# Patient Record
Sex: FEMALE | Race: BLACK OR AFRICAN AMERICAN | NOT HISPANIC OR LATINO | Employment: FULL TIME | URBAN - METROPOLITAN AREA
[De-identification: names, ages, dates, MRNs, and addresses within clinical notes are randomized per-mention and may not be internally consistent; named-entity substitution may affect disease eponyms.]

---

## 2023-07-20 ENCOUNTER — TELEPHONE (OUTPATIENT)
Dept: PRIMARY CARE | Facility: CLINIC | Age: 23
End: 2023-07-20
Payer: COMMERCIAL

## 2023-09-27 LAB
CHLAMYDIA TRACH., AMPLIFIED: NEGATIVE
N. GONORRHEA, AMPLIFIED: NEGATIVE
TRICHOMONAS VAGINALIS: NEGATIVE

## 2023-10-06 ENCOUNTER — LAB (OUTPATIENT)
Dept: LAB | Facility: LAB | Age: 23
End: 2023-10-06
Payer: COMMERCIAL

## 2023-10-06 DIAGNOSIS — Z11.3 ENCOUNTER FOR SCREENING FOR INFECTIONS WITH A PREDOMINANTLY SEXUAL MODE OF TRANSMISSION: Primary | ICD-10-CM

## 2023-10-06 PROCEDURE — 36415 COLL VENOUS BLD VENIPUNCTURE: CPT

## 2023-10-06 PROCEDURE — 87340 HEPATITIS B SURFACE AG IA: CPT

## 2023-10-06 PROCEDURE — 87389 HIV-1 AG W/HIV-1&-2 AB AG IA: CPT

## 2023-10-06 PROCEDURE — 86803 HEPATITIS C AB TEST: CPT

## 2023-10-06 PROCEDURE — 86780 TREPONEMA PALLIDUM: CPT

## 2023-10-07 LAB
HBV SURFACE AG SERPL QL IA: NONREACTIVE
HCV AB SER QL: NONREACTIVE
HIV 1+2 AB+HIV1 P24 AG SERPL QL IA: NONREACTIVE
T PALLIDUM AB SER QL: NONREACTIVE

## 2023-11-14 ENCOUNTER — APPOINTMENT (OUTPATIENT)
Dept: OBSTETRICS AND GYNECOLOGY | Facility: CLINIC | Age: 23
End: 2023-11-14
Payer: COMMERCIAL

## 2023-11-24 ENCOUNTER — HOSPITAL ENCOUNTER (EMERGENCY)
Facility: HOSPITAL | Age: 23
Discharge: HOME | End: 2023-11-24
Payer: COMMERCIAL

## 2023-11-24 ENCOUNTER — APPOINTMENT (OUTPATIENT)
Dept: RADIOLOGY | Facility: HOSPITAL | Age: 23
End: 2023-11-24
Payer: COMMERCIAL

## 2023-11-24 VITALS
WEIGHT: 123 LBS | OXYGEN SATURATION: 98 % | HEART RATE: 75 BPM | HEIGHT: 59 IN | DIASTOLIC BLOOD PRESSURE: 93 MMHG | SYSTOLIC BLOOD PRESSURE: 125 MMHG | BODY MASS INDEX: 24.8 KG/M2 | RESPIRATION RATE: 18 BRPM | TEMPERATURE: 97.9 F

## 2023-11-24 DIAGNOSIS — S06.0X0A CONCUSSION WITHOUT LOSS OF CONSCIOUSNESS, INITIAL ENCOUNTER: Primary | ICD-10-CM

## 2023-11-24 PROCEDURE — 2500000005 HC RX 250 GENERAL PHARMACY W/O HCPCS: Performed by: PHYSICIAN ASSISTANT

## 2023-11-24 PROCEDURE — 72125 CT NECK SPINE W/O DYE: CPT | Performed by: RADIOLOGY

## 2023-11-24 PROCEDURE — 72125 CT NECK SPINE W/O DYE: CPT

## 2023-11-24 PROCEDURE — 2500000001 HC RX 250 WO HCPCS SELF ADMINISTERED DRUGS (ALT 637 FOR MEDICARE OP): Performed by: PHYSICIAN ASSISTANT

## 2023-11-24 PROCEDURE — 70450 CT HEAD/BRAIN W/O DYE: CPT | Performed by: RADIOLOGY

## 2023-11-24 PROCEDURE — 99285 EMERGENCY DEPT VISIT HI MDM: CPT | Mod: 25

## 2023-11-24 PROCEDURE — 70450 CT HEAD/BRAIN W/O DYE: CPT

## 2023-11-24 RX ORDER — IBUPROFEN 600 MG/1
600 TABLET ORAL ONCE
Status: COMPLETED | OUTPATIENT
Start: 2023-11-24 | End: 2023-11-24

## 2023-11-24 RX ORDER — IBUPROFEN 600 MG/1
600 TABLET ORAL EVERY 6 HOURS PRN
Qty: 20 TABLET | Refills: 0 | Status: SHIPPED | OUTPATIENT
Start: 2023-11-24 | End: 2023-11-29

## 2023-11-24 RX ORDER — GUAIFENESIN 1200 MG
650 TABLET, EXTENDED RELEASE 12 HR ORAL EVERY 6 HOURS PRN
Qty: 20 CAPSULE | Refills: 0 | Status: SHIPPED | OUTPATIENT
Start: 2023-11-24 | End: 2023-11-29

## 2023-11-24 RX ORDER — ONDANSETRON 4 MG/1
4 TABLET, ORALLY DISINTEGRATING ORAL EVERY 8 HOURS PRN
Qty: 9 TABLET | Refills: 0 | Status: SHIPPED | OUTPATIENT
Start: 2023-11-24 | End: 2023-11-27

## 2023-11-24 RX ORDER — ACETAMINOPHEN 325 MG/1
650 TABLET ORAL ONCE
Status: COMPLETED | OUTPATIENT
Start: 2023-11-24 | End: 2023-11-24

## 2023-11-24 RX ORDER — ONDANSETRON 4 MG/1
4 TABLET, ORALLY DISINTEGRATING ORAL ONCE
Status: COMPLETED | OUTPATIENT
Start: 2023-11-24 | End: 2023-11-24

## 2023-11-24 RX ADMIN — ONDANSETRON 4 MG: 4 TABLET, ORALLY DISINTEGRATING ORAL at 19:13

## 2023-11-24 RX ADMIN — IBUPROFEN 600 MG: 600 TABLET, FILM COATED ORAL at 19:13

## 2023-11-24 RX ADMIN — ACETAMINOPHEN 650 MG: 325 TABLET ORAL at 19:13

## 2023-11-24 ASSESSMENT — COLUMBIA-SUICIDE SEVERITY RATING SCALE - C-SSRS
2. HAVE YOU ACTUALLY HAD ANY THOUGHTS OF KILLING YOURSELF?: NO
1. IN THE PAST MONTH, HAVE YOU WISHED YOU WERE DEAD OR WISHED YOU COULD GO TO SLEEP AND NOT WAKE UP?: NO
6. HAVE YOU EVER DONE ANYTHING, STARTED TO DO ANYTHING, OR PREPARED TO DO ANYTHING TO END YOUR LIFE?: NO

## 2023-11-24 NOTE — ED PROVIDER NOTES
HPI     CC: Head Injury (Pt to ED for c/o head injury at work today around 1600. Pt states she hit the top of her head. C/o blurry vision, nausea and feeling tired. )     HPI: Dianna Higgins is a 23 y.o. female with a history of migraines presents with concern for head injury.  Patient states that she works as a  and she was tasked with putting the [employer] tree today.  They asked her to use a new lift which she did not feel comfortable with.  She went to move something and sit up quickly and hit her head on a metal bar.  She did not lose consciousness and does not take anticoagulation.  She took a couple steps forward because she felt kind of weak all over but started to feel better.  Still felt that she should get checked out.  Initially went to urgent care who sent her here.  Her symptoms include an inability to focus, nausea, fatigue, photophobia, and persistent headache.  She has never had a concussion before.  She has not taken any medication at this time.  She denies paresthesias to the extremities.    ROS: 10-point review of systems was performed and is otherwise negative except as noted in HPI.      Past Medical History: Noncontributory except per HPI     Past Surgical History: Noncontributory except per HPI     Family History: Reviewed and noncontributory     Social History:  Denies tobacco. Denies ETOH. Denies illicit drugs.      Allergies   Allergen Reactions    Penicillins Rash       Home Meds:   Current Outpatient Medications   Medication Instructions    acetaminophen (TYLENOL) 650 mg, oral, Every 6 hours PRN    ibuprofen 600 mg, oral, Every 6 hours PRN    ondansetron ODT (ZOFRAN-ODT) 4 mg, oral, Every 8 hours PRN        ED Triage Vitals [11/24/23 1748]   Temp Heart Rate Resp BP   36.6 °C (97.9 °F) 75 18 (!) 125/93      SpO2 Temp src Heart Rate Source Patient Position   98 % -- -- --      BP Location FiO2 (%)     -- --         Heart Rate:  [75]   Temp:  [36.6 °C (97.9 °F)]   Resp:  [18]  "  BP: (125)/(93)   Height:  [149.9 cm (4' 11\")]   Weight:  [55.8 kg (123 lb)]   SpO2:  [98 %]      Physical Exam:  Physical Exam  Constitutional:       General: She is not in acute distress.     Appearance: Normal appearance. She is not ill-appearing.   HENT:      Head: Normocephalic and atraumatic.      Comments: Tenderness to the top of the head with some mild erythema.  No palpable deformities or lacerations.     Right Ear: External ear normal.      Left Ear: External ear normal.      Nose: Nose normal.      Mouth/Throat:      Mouth: Mucous membranes are moist.   Eyes:      Extraocular Movements: Extraocular movements intact.      Conjunctiva/sclera: Conjunctivae normal.      Pupils: Pupils are equal, round, and reactive to light.   Cardiovascular:      Rate and Rhythm: Normal rate and regular rhythm.      Pulses: Normal pulses.   Pulmonary:      Effort: Pulmonary effort is normal. No respiratory distress.      Breath sounds: Normal breath sounds.   Abdominal:      General: Abdomen is flat.      Palpations: Abdomen is soft.      Tenderness: There is no abdominal tenderness.   Musculoskeletal:         General: Normal range of motion.      Cervical back: Normal range of motion and neck supple. No rigidity or tenderness.   Skin:     General: Skin is warm and dry.      Capillary Refill: Capillary refill takes less than 2 seconds.   Neurological:      General: No focal deficit present.      Mental Status: She is alert and oriented to person, place, and time.      Sensory: No sensory deficit.      Motor: No weakness.      Coordination: Coordination normal.      Gait: Gait normal.   Psychiatric:         Mood and Affect: Mood normal.          Diagnostic Results        Labs Reviewed - No data to display      CT head wo IV contrast   Final Result   1. No acute intracranial hemorrhage or mass effect.   2. No acute fracture or traumatic malalignment of the cervical spine.        Signed by: Harvey Bills 11/24/2023 6:37 " PM   Dictation workstation:   [id number]      CT cervical spine wo IV contrast   Final Result   1. No acute intracranial hemorrhage or mass effect.   2. No acute fracture or traumatic malalignment of the cervical spine.        Signed by: Harvey Bills 11/24/2023 6:37 PM   Dictation workstation:   [id number]                    No data recorded                Procedure  Procedures    ED Course & MDM   Assessment/Plan:     Medications   ondansetron ODT (Zofran-ODT) disintegrating tablet 4 mg (4 mg oral Given 11/24/23 1913)   acetaminophen (Tylenol) tablet 650 mg (650 mg oral Given 11/24/23 1913)   ibuprofen tablet 600 mg (600 mg oral Given 11/24/23 1913)        Diagnoses as of 11/24/23 2044   Concussion without loss of consciousness, initial encounter       Medical Decision Making    Dianna Higgins is a 23 y.o. female with a history of migraines presents after head injury.  The patient is nontoxic-appearing and her vital signs are normal.  Her physical exam is reassuring.  Based on her presentation, differential diagnosis includes concussion, TBI, or cervical spine strain.  Will obtain CT head without contrast and CT C-spine without contrast.  These were obtained and were negative.  Patient was given 4 mg oral Zofran, 650 mg of Tylenol, and 600 mg of Motrin.  No further work-up indicated at this time.  Patient is seen ambulating without difficulty.    Concussion: Patient was educated that any head strike is considered a concussion.  This can sometimes have a lasting side effect such as prolonged headaches or photophobia.  Recommend avoiding any activity that worsens your symptoms such as screen time, loud noises, or bright lights.  We will place an order for neurology follow-up for concussion clinic follow-up if symptoms persist.  Can take Tylenol and Motrin for additional pain control.  I have also prescribed Zofran for any persistent nausea.  The patient is in school, so she was given a school excuse to  have a few days off.  Strongly recommended following up with neurology as they can also rachel paperwork for extended testing time or any other school accommodations.  Recommended monitoring for signs and symptoms of worsening head injury such as increased headache, vision changes, confusion, nausea, or vomiting.  Patient agreeable to plan of care and felt comfortable returning home.    Disposition: Home    ED Prescriptions       Medication Sig Dispense Start Date End Date Auth. Provider    ondansetron ODT (Zofran-ODT) 4 mg disintegrating tablet Take 1 tablet (4 mg) by mouth every 8 hours if needed for nausea or vomiting for up to 3 days. 9 tablet 11/24/2023 11/27/2023 Jannet Ruth PA-C    ibuprofen 600 mg tablet Take 1 tablet (600 mg) by mouth every 6 hours if needed for mild pain (1 - 3) or moderate pain (4 - 6) for up to 5 days. 20 tablet 11/24/2023 11/29/2023 MOSHE RochaC    acetaminophen (Tylenol) 325 mg capsule Take 2 capsules (650 mg) by mouth every 6 hours if needed for mild pain (1 - 3) or moderate pain (4 - 6) for up to 5 days. 20 capsule 11/24/2023 11/29/2023 Jannet Ruth PA-C            Social Determinants Affecting Care: none      Jannet Ruth PA-C    This note was dictated by speech recognition. Minor errors in transcription may be present.     Jannet Ruth PA-C  11/24/23 2044

## 2023-11-24 NOTE — Clinical Note
Dianna Higgins was seen and treated in our emergency department on 11/24/2023.  She may return to work on 11/29/2023.       If you have any questions or concerns, please don't hesitate to call.      Jannet Ruth PA-C

## 2023-11-24 NOTE — Clinical Note
Dianna Higgins was seen and treated in our emergency department on 11/24/2023.  She may return to school on 11/29/2023.      If you have any questions or concerns, please don't hesitate to call.      Jannet Ruth PA-C

## 2023-11-29 ENCOUNTER — OFFICE VISIT (OUTPATIENT)
Dept: PRIMARY CARE | Facility: CLINIC | Age: 23
End: 2023-11-29
Payer: COMMERCIAL

## 2023-11-29 VITALS
HEART RATE: 73 BPM | WEIGHT: 123.8 LBS | SYSTOLIC BLOOD PRESSURE: 115 MMHG | HEIGHT: 59 IN | BODY MASS INDEX: 24.96 KG/M2 | DIASTOLIC BLOOD PRESSURE: 80 MMHG

## 2023-11-29 DIAGNOSIS — S06.0X0A CONCUSSION WITHOUT LOSS OF CONSCIOUSNESS, INITIAL ENCOUNTER: Primary | ICD-10-CM

## 2023-11-29 PROCEDURE — 1036F TOBACCO NON-USER: CPT | Performed by: FAMILY MEDICINE

## 2023-11-29 PROCEDURE — 99203 OFFICE O/P NEW LOW 30 MIN: CPT | Performed by: FAMILY MEDICINE

## 2023-11-29 RX ORDER — CLOBETASOL PROPIONATE 0.46 MG/ML
SOLUTION TOPICAL
COMMUNITY
Start: 2023-10-13

## 2023-11-29 RX ORDER — ADAPALENE AND BENZOYL PEROXIDE 3; 25 MG/G; MG/G
GEL TOPICAL
COMMUNITY
Start: 2019-01-21

## 2023-11-29 RX ORDER — AZITHROMYCIN 250 MG/1
TABLET, FILM COATED ORAL
COMMUNITY
Start: 2023-01-20

## 2023-11-29 NOTE — PROGRESS NOTES
Pt is here for concussion.     Dianna Higgins is a 23 year old female presenting to concussion clinic for evaluation.     Nov 24, 2023- she was working at [employer] at [place] and she was putting up [employer] and there was a piece of machinery that lifts her. She was working and reaching and she accidentally slammed her head into a metal bar. No LOC.  Her mother eventually got her and took her to the ER.   ER did scans then she was discharged.     Headache  Pressure on top of head  Fuzzy fog sensation  Neck pain    Work: [employer]- she has not been back to work since the injury.   School: [employer].   Sports: goes to the gym.   Paperwork: work related, has filled out a FROI. No EMS. No current attorneys.       Date of current head injury:   - Nov 24, 2023  Previous concussion history:  - none  Imaging for a head injury/concussion:  - CT head and c-spine 11/24/23: unremarkable.   Depression, anxiety, psychiatric disorder, learning disability, dyslexia, ADD/ADHD?:  - no formal dx.   Headache history:   - migraine headaches      Concussion symptoms: severity 0 to 6    Headache 2  Pressure in head 3  Neck pain 3  Nausea or vomiting 0  Dizziness 1  Blurred vision 1  Balance problems 1  Sensitivity to light 2  Sensitivity to noise 0  Feeling slowed down 3  Feeling like in a fog 3  Don't feel right 2  Difficulty concentrating 2  Difficulty remembering 2  Fatigue or low energy 1  Confusion 2  Drowsiness 2  More emotional 3  Irritability 3  Sadness 3  Nervous or Anxious 3  Trouble falling asleep     Visit Vitals  /80   Pulse 73        Gen: NAD, Alert and oriented x3  Head: generalized scalp tenderness.  Face: no specific areas of ttp; no step offs  Psych: mood pleasant and appropriate  Resp: good respiratory effort  Neurologic: CN II-XII grossly intact. Strength and sensation symmetric and intact throughout all 4 extremities. DTR 2+ in all 4 extremities. Cerebellar testing  normal. Negative Rhomberg's test. No dysdiadochokinesis.  Gait: normal      CT head wo IV contrast, CT cervical spine wo IV contrast  Narrative: Interpreted By:  Harvey Bills,   STUDY:  CT HEAD WO IV CONTRAST; CT CERVICAL SPINE WO IV CONTRAST;  11/24/2023  6:12 pm      INDICATION:  Signs/Symptoms:Trauma; Signs/Symptoms:trauma.      COMPARISON:  None.      ACCESSION NUMBER(S):  [id number]; [id number]      ORDERING CLINICIAN:  ANALISA PINTO      TECHNIQUE:  Axial noncontrast CT images of the head and cervical spine. Sagittal  and coronal reformats were provided.      FINDINGS:  Head:      BRAIN: No acute intracranial hemorrhage. No mass effect or midline  shift. Gray-white matter interfaces are preserved. VENTRICLES and  EXTRA-AXIAL SPACES: No hydrocephalus or extra-axial collection. Cavum  septum pellucidum et vergae. EXTRACRANIAL SOFT TISSUES:  Mild soft  tissue swelling about along the scalp vertex. PARANASAL  SINUSES/MASTOIDS: The visualized paranasal sinuses and mastoid air  cells are aerated. BONES AND ORBITS: No displaced skull fracture. No  suspicious osseous lesion.  Orbits are within normal limits. OTHER  FINDINGS: None.      Cervical Spine:      FRACTURES: There is no evidence for an acute fracture of the cervical  spine. VERTEBRAL ALIGNMENT: Straightening of normal cervical lordosis  which may be related to muscle spasm or patient positioning.  CRANIOCERVICAL JUNCTION: Within normal limits. VERTEBRA/DISC SPACES:  Vertebral body heights and the disc spaces are within normal limits.  SOFT TISSUES: Within normal limits. UPPER CHEST: Visualized portions  are within normal limits.      Impression: 1. No acute intracranial hemorrhage or mass effect.  2. No acute fracture or traumatic malalignment of the cervical spine.      Signed by: Harvey Bills 11/24/2023 6:37 PM  Dictation workstation:   [id number]      CT head wo IV contrast    Result Date: 11/24/2023  Interpreted By:  Negar  Harvey, STUDY: CT HEAD WO IV CONTRAST; CT CERVICAL SPINE WO IV CONTRAST;  11/24/2023 6:12 pm   INDICATION: Signs/Symptoms:Trauma; Signs/Symptoms:trauma.   COMPARISON: None.   ACCESSION NUMBER(S): [id number]; [id number]   ORDERING CLINICIAN: ANALISA PINTO   TECHNIQUE: Axial noncontrast CT images of the head and cervical spine. Sagittal and coronal reformats were provided.   FINDINGS: Head:   BRAIN: No acute intracranial hemorrhage. No mass effect or midline shift. Gray-white matter interfaces are preserved. VENTRICLES and EXTRA-AXIAL SPACES: No hydrocephalus or extra-axial collection. Cavum septum pellucidum et vergae. EXTRACRANIAL SOFT TISSUES:  Mild soft tissue swelling about along the scalp vertex. PARANASAL SINUSES/MASTOIDS: The visualized paranasal sinuses and mastoid air cells are aerated. BONES AND ORBITS: No displaced skull fracture. No suspicious osseous lesion.  Orbits are within normal limits. OTHER FINDINGS: None.   Cervical Spine:   FRACTURES: There is no evidence for an acute fracture of the cervical spine. VERTEBRAL ALIGNMENT: Straightening of normal cervical lordosis which may be related to muscle spasm or patient positioning. CRANIOCERVICAL JUNCTION: Within normal limits. VERTEBRA/DISC SPACES: Vertebral body heights and the disc spaces are within normal limits. SOFT TISSUES: Within normal limits. UPPER CHEST: Visualized portions are within normal limits.       1. No acute intracranial hemorrhage or mass effect. 2. No acute fracture or traumatic malalignment of the cervical spine.   Signed by: Harvey Bills 11/24/2023 6:37 PM Dictation workstation:   [id number]     1. Concussion without loss of consciousness, initial encounter               You have suffered a concussion which is an injury to the brain that takes a variable amount of time to recover.   Relative rest is the best treatment, but physical activity that does not worsen your symptoms can be an important part of recovery.  "    Your concussion symptoms are improving and are currently mild.     Your recovery may be slowed by the risk factors we discussed.    I recommend limiting screen time. No more than 2 hours/day of total screen time is a reasonable amount.      Tylenol or ibuprofen are ok for headaches.     No strenuous physical activity for the time being, such as heavy lifting or intense exercise.     You should avoid activities that place you at risk for sustaining another head injury.      School note printed.    Work note printed.    Work modifications include: recommend no RTW until further clearance.     I can complete [facility] paperwork and fax it to the number you provide to the office.     I recommend establishing with a provider that specializes in [facility] cases. I will happily stay on as the concussion specialist and make my recommendations during your recovery. Before your first [facility] visit- most offices will require you to have the \"First Report of Injury\" completed by an ER or urgent care facility. Consider:    Dr. Colin Boykin  [facility]  [address] [place] [address].  [place] [address]  Patients can call the central scheduling number at [phone] and request to see Dr. Colin Boykin.       Dr. Esvin Wagner  Location: [facility] (Monday, Thursday)  [facility]  [address] [facility], [address]  Patients can call the central scheduling number at [phone] and request to see Dr. Esvin Wagner.       Follow up with Dr. Velazquez: about 1 week.     If you are not improving by the time you follow up- we will consider a referral to physical therapy.    If physical therapy and rest do not help you fully recover, another step in the management of your head injury may be a referral to neuropsychology.   "

## 2023-11-29 NOTE — LETTER
November 29, 2023     Patient: Dianna Higgins   YOB: 2000   Date of Visit: 11/29/2023       To Whom It May Concern:    Dianna Higgins was seen in my clinic on 11/29/2023 at 10:15 am for a concussion. Please allow accommodations for school. She will follow up in one week.  Please excuse Dianna for her absence from school on this day to make the appointment.    If you have any questions or concerns, please don't hesitate to call.         Sincerely,         Mauro Velazquez,         CC: No Recipients

## 2023-12-06 ENCOUNTER — OFFICE VISIT (OUTPATIENT)
Dept: PRIMARY CARE | Facility: CLINIC | Age: 23
End: 2023-12-06
Payer: COMMERCIAL

## 2023-12-06 VITALS
HEIGHT: 59 IN | SYSTOLIC BLOOD PRESSURE: 118 MMHG | HEART RATE: 77 BPM | DIASTOLIC BLOOD PRESSURE: 73 MMHG | BODY MASS INDEX: 25.2 KG/M2 | WEIGHT: 125 LBS

## 2023-12-06 DIAGNOSIS — S06.0X0A CONCUSSION WITHOUT LOSS OF CONSCIOUSNESS, INITIAL ENCOUNTER: Primary | ICD-10-CM

## 2023-12-06 PROCEDURE — 99214 OFFICE O/P EST MOD 30 MIN: CPT | Performed by: FAMILY MEDICINE

## 2023-12-06 PROCEDURE — 1036F TOBACCO NON-USER: CPT | Performed by: FAMILY MEDICINE

## 2023-12-06 NOTE — ASSESSMENT & PLAN NOTE
"Your concussion symptoms are improving and are currently minimal.     Your recovery may be slowed by the risk factors we discussed.      Tylenol or ibuprofen are ok for headaches.     You should avoid activities that place you at risk for sustaining another head injury.      School note printed.    Work note printed.      I can complete [facility] paperwork and fax it to the number you provide to the office.     I recommend establishing with a provider that specializes in [facility] cases. I will happily stay on as the concussion specialist and make my recommendations during your recovery. Before your first [facility] visit- most offices will require you to have the \"First Report of Injury\" completed by an ER or urgent care facility. Consider:    Dr. Colin Boykin  [facility]  [id number] [place] [address].  [place] [address]  Patients can call the central scheduling number at [phone] and request to see Dr. Colin Boykin.       Dr. Esvin Wagner  Location: [facility] (Monday, Thursday)  [facility]  [address] [place] [address], [address]  Patients can call the central scheduling number at [phone] and request to see Dr. Esvin Wagner.       Follow up with Dr. Velazquez: about only as needed.     If you are not improving by the time you follow up- we will consider a referral to physical therapy.    If physical therapy and rest do not help you fully recover, another step in the management of your head injury may be a referral to neuropsychology.   "

## 2023-12-06 NOTE — PROGRESS NOTES
Pt is here for concussion fuv. Pt has no concerns    Pt is here for concussion.     Dianna Higgins is a 23 year old female presenting to concussion clinic for reevaluation.     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 She was working and reaching and she accidentally slammed her head into a metal bar. No LOC.  Her mother eventually got her and took her to the ER.   ER did scans then she was discharged.     Last visit 11/29/23:   - she really started feeling better this past weekend.   - feels 85% back to normal.       Headache 12/6/23 update: pain is not as bad as last week.   Pressure on top of head. 12/6/23 update: also subsided.   Fuzzy fog sensation. 12/6/23 update: still feels a little fuzzy.   Neck pain. 12/6/23 update: also gone down.     Work: [employer]- she has not been back to work since the injury. 12/6/23 update: she did go back Monday. Agreed on note that she can RTW tomorrow without restrictions.   School: [employer].   Sports: goes to the gym.   Paperwork: work related, has filled out a FROI. No EMS. No current attorneys.       Date of current head injury:   - Nov 24, 2023  Previous concussion history:  - none  Imaging for a head injury/concussion:  - CT head and c-spine 11/24/23: unremarkable.   Depression, anxiety, psychiatric disorder, learning disability, dyslexia, ADD/ADHD?:  - no formal dx.   Headache history:   - migraine headaches      Concussion symptoms: severity 0 to 6    Headache 1  Pressure in head 1  Neck pain 0  Nausea or vomiting 0  Dizziness 0  Blurred vision 1  Balance problems 0  Sensitivity to light 1  Sensitivity to noise 0  Feeling slowed down 1  Feeling like in a fog 1  Don't feel right 0  Difficulty concentrating 1  Difficulty remembering 1  Fatigue or low energy 1  Confusion 0  Drowsiness 1  More emotional 0  Irritability 1  Sadness 0  Nervous  or Anxious 1  Trouble falling asleep  1    Visit Vitals  /73   Pulse 77        Gen: NAD, Alert and oriented x3  Head: generalized scalp tenderness.  Face: no specific areas of ttp; no step offs  Psych: mood pleasant and appropriate  Resp: good respiratory effort  Neurologic: CN II-XII grossly intact. Strength and sensation symmetric and intact throughout all 4 extremities. DTR 2+ in all 4 extremities. Cerebellar testing normal. Negative Rhomberg's test. No dysdiadochokinesis.  Gait: normal      CT head wo IV contrast, CT cervical spine wo IV contrast  Narrative: Interpreted By:  Harvey Bills,   STUDY:  CT HEAD WO IV CONTRAST; CT CERVICAL SPINE WO IV CONTRAST;  11/24/2023  6:12 pm      INDICATION:  Signs/Symptoms:Trauma; Signs/Symptoms:trauma.      COMPARISON:  None.      ACCESSION NUMBER(S):  [id number]; [id number]      ORDERING CLINICIAN:  ANALISA PINTO      TECHNIQUE:  Axial noncontrast CT images of the head and cervical spine. Sagittal  and coronal reformats were provided.      FINDINGS:  Head:      BRAIN: No acute intracranial hemorrhage. No mass effect or midline  shift. Gray-white matter interfaces are preserved. VENTRICLES and  EXTRA-AXIAL SPACES: No hydrocephalus or extra-axial collection. Cavum  septum pellucidum et vergae. EXTRACRANIAL SOFT TISSUES:  Mild soft  tissue swelling about along the scalp vertex. PARANASAL  SINUSES/MASTOIDS: The visualized paranasal sinuses and mastoid air  cells are aerated. BONES AND ORBITS: No displaced skull fracture. No  suspicious osseous lesion.  Orbits are within normal limits. OTHER  FINDINGS: None.      Cervical Spine:      FRACTURES: There is no evidence for an acute fracture of the cervical  spine. VERTEBRAL ALIGNMENT: Straightening of normal cervical lordosis  which may be related to muscle spasm or patient positioning.  CRANIOCERVICAL JUNCTION: Within normal limits. VERTEBRA/DISC SPACES:  Vertebral body heights and the disc spaces are within normal  limits.  SOFT TISSUES: Within normal limits. UPPER CHEST: Visualized portions  are within normal limits.      Impression: 1. No acute intracranial hemorrhage or mass effect.  2. No acute fracture or traumatic malalignment of the cervical spine.      Signed by: Harvey Bills 11/24/2023 6:37 PM  Dictation workstation:   [id number]      CT head wo IV contrast    Result Date: 11/24/2023  Interpreted By:  Harvey Bills, STUDY: CT HEAD WO IV CONTRAST; CT CERVICAL SPINE WO IV CONTRAST;  11/24/2023 6:12 pm   INDICATION: Signs/Symptoms:Trauma; Signs/Symptoms:trauma.   COMPARISON: None.   ACCESSION NUMBER(S): [id number]; [id number]   ORDERING CLINICIAN: ANALISA PINTO   TECHNIQUE: Axial noncontrast CT images of the head and cervical spine. Sagittal and coronal reformats were provided.   FINDINGS: Head:   BRAIN: No acute intracranial hemorrhage. No mass effect or midline shift. Gray-white matter interfaces are preserved. VENTRICLES and EXTRA-AXIAL SPACES: No hydrocephalus or extra-axial collection. Cavum septum pellucidum et vergae. EXTRACRANIAL SOFT TISSUES:  Mild soft tissue swelling about along the scalp vertex. PARANASAL SINUSES/MASTOIDS: The visualized paranasal sinuses and mastoid air cells are aerated. BONES AND ORBITS: No displaced skull fracture. No suspicious osseous lesion.  Orbits are within normal limits. OTHER FINDINGS: None.   Cervical Spine:   FRACTURES: There is no evidence for an acute fracture of the cervical spine. VERTEBRAL ALIGNMENT: Straightening of normal cervical lordosis which may be related to muscle spasm or patient positioning. CRANIOCERVICAL JUNCTION: Within normal limits. VERTEBRA/DISC SPACES: Vertebral body heights and the disc spaces are within normal limits. SOFT TISSUES: Within normal limits. UPPER CHEST: Visualized portions are within normal limits.       1. No acute intracranial hemorrhage or mass effect. 2. No acute fracture or traumatic malalignment of the cervical spine.  "  Signed by: Harvey Negar 11/24/2023 6:37 PM Dictation workstation:   [id number]     1. Concussion without loss of consciousness, initial encounter                Your concussion symptoms are improving and are currently minimal.     Your recovery may be slowed by the risk factors we discussed.      Tylenol or ibuprofen are ok for headaches.     You should avoid activities that place you at risk for sustaining another head injury.      School note printed.    Work note printed.      I can complete [place] paperwork and fax it to the number you provide to the office.     I recommend establishing with a provider that specializes in [facility] cases. I will happily stay on as the concussion specialist and make my recommendations during your recovery. Before your first [facility] visit- most offices will require you to have the \"First Report of Injury\" completed by an ER or urgent care facility. Consider:    Dr. Colin Boykin  [facility]  [address] [place] [address].  [place] [address]  Patients can call the central scheduling number at [phone] and request to see Dr. Colin Boykin.       Dr. Esvin Wagner  Location: [facility] (Monday, Thursday)  [facility]  [address] [facility], [address]  Patients can call the central scheduling number at [phone] and request to see Dr. Esvin Wagner.       Follow up with Dr. Velazquez: about only as needed.     If you are not improving by the time you follow up- we will consider a referral to physical therapy.    If physical therapy and rest do not help you fully recover, another step in the management of your head injury may be a referral to neuropsychology.   "

## 2024-02-08 ENCOUNTER — TELEPHONE (OUTPATIENT)
Dept: PRIMARY CARE | Facility: CLINIC | Age: 24
End: 2024-02-08
Payer: COMMERCIAL

## 2024-10-24 ENCOUNTER — OFFICE VISIT (OUTPATIENT)
Dept: URGENT CARE | Age: 24
End: 2024-10-24
Payer: COMMERCIAL

## 2024-10-24 VITALS
OXYGEN SATURATION: 97 % | HEART RATE: 95 BPM | SYSTOLIC BLOOD PRESSURE: 118 MMHG | DIASTOLIC BLOOD PRESSURE: 82 MMHG | TEMPERATURE: 99.3 F

## 2024-10-24 DIAGNOSIS — B27.90 INFECTIOUS MONONUCLEOSIS WITHOUT COMPLICATION, INFECTIOUS MONONUCLEOSIS DUE TO UNSPECIFIED ORGANISM: Primary | ICD-10-CM

## 2024-10-24 DIAGNOSIS — Z20.822 SUSPECTED COVID-19 VIRUS INFECTION: ICD-10-CM

## 2024-10-24 DIAGNOSIS — J02.9 ACUTE PHARYNGITIS, UNSPECIFIED ETIOLOGY: ICD-10-CM

## 2024-10-24 DIAGNOSIS — J02.9 SORE THROAT: ICD-10-CM

## 2024-10-24 LAB
POC RAPID INFLUENZA A: NEGATIVE
POC RAPID INFLUENZA B: NEGATIVE
POC RAPID MONO: POSITIVE
POC RAPID STREP: NEGATIVE
POC SARS-COV-2 AG BINAX: NORMAL

## 2024-10-24 ASSESSMENT — ENCOUNTER SYMPTOMS
VOMITING: 0
HEADACHES: 0
TROUBLE SWALLOWING: 0
SHORTNESS OF BREATH: 0
HOARSE VOICE: 0
ABDOMINAL PAIN: 0
STRIDOR: 0
SORE THROAT: 1
NECK PAIN: 0
COUGH: 0
DIARRHEA: 0
SWOLLEN GLANDS: 0

## 2024-10-24 NOTE — PROGRESS NOTES
Subjective   Patient ID: Dianna Higgins is a 24 y.o. female. They present today with a chief complaint of Sore Throat.    History of Present Illness  Patient reported sore throat and right ear pain for 6 days.  Patient was seen at [facility] and was tested negative for strep.  She also added back x-rays for back pain and x-ray was unremarkable.  She was treated with prednisone and an inhaler.  However, patient would like to be tested for strep again.  Along with that she wanted to be tested for mono, COVID and flu.      History provided by:  Patient   used: No    Sore Throat   This is a new problem. Episode onset: 6 days. Associated symptoms include ear pain. Pertinent negatives include no abdominal pain, congestion, coughing, diarrhea, drooling, ear discharge, headaches, hoarse voice, plugged ear sensation, neck pain, shortness of breath, stridor, swollen glands, trouble swallowing or vomiting.       Past Medical History  Allergies as of 10/24/2024 - Reviewed 10/24/2024   Allergen Reaction Noted    Penicillins Rash 11/24/2023       (Not in a hospital admission)       Past Medical History:   Diagnosis Date    Encounter for initial prescription of injectable contraceptive 09/09/2020    Encounter for initial prescription of injectable contraceptive    Personal history of other diseases of the nervous system and sense organs 08/01/2014    History of myopia       History reviewed. No pertinent surgical history.     reports that she has never smoked. She has never used smokeless tobacco. She reports that she does not drink alcohol and does not use drugs.    Review of Systems  Review of Systems   HENT:  Positive for ear pain and sore throat. Negative for congestion, drooling, ear discharge, hoarse voice and trouble swallowing.    Respiratory:  Negative for cough, shortness of breath and stridor.    Gastrointestinal:  Negative for abdominal pain, diarrhea and vomiting.   Musculoskeletal:   Negative for neck pain.   Neurological:  Negative for headaches.                                  Objective    Vitals:    10/24/24 1239   BP: 118/82   Pulse: 95   Temp: 37.4 °C (99.3 °F)   SpO2: 97%     No LMP recorded.    Physical Exam  Vitals and nursing note reviewed.   Constitutional:       Appearance: Normal appearance.   HENT:      Head: Normocephalic and atraumatic.      Right Ear: Hearing, tympanic membrane, ear canal and external ear normal.      Left Ear: Hearing, tympanic membrane, ear canal and external ear normal.      Nose: Nose normal. No nasal deformity, septal deviation, signs of injury, laceration, nasal tenderness, mucosal edema, congestion or rhinorrhea.      Right Sinus: No maxillary sinus tenderness or frontal sinus tenderness.      Left Sinus: No maxillary sinus tenderness or frontal sinus tenderness.      Mouth/Throat:      Lips: Pink.      Mouth: Mucous membranes are moist.      Pharynx: Uvula midline.      Tonsils: No tonsillar exudate (White exudate noted on the left tonsils) or tonsillar abscesses. 2+ on the right. 2+ on the left.   Cardiovascular:      Rate and Rhythm: Normal rate and regular rhythm.      Heart sounds: Normal heart sounds.   Pulmonary:      Effort: Pulmonary effort is normal.      Breath sounds: Normal breath sounds and air entry.   Musculoskeletal:      Cervical back: Normal range of motion and neck supple.   Lymphadenopathy:      Cervical: No cervical adenopathy.   Neurological:      Mental Status: She is alert.   Psychiatric:         Mood and Affect: Mood normal.         Behavior: Behavior normal.         Procedures    Point of Care Test & Imaging Results from this visit  Results for orders placed or performed in visit on 10/24/24   POCT Covid-19 Rapid Antigen   Result Value Ref Range    POC DOROTHY-COV-2 AG  Presumptive negative test for SARS-CoV-2 (no antigen detected)     Presumptive negative test for SARS-CoV-2 (no antigen detected)   POCT Infectious mononucleosis  antibody manually resulted   Result Value Ref Range    POC Rapid Mono Positive (A) Negative   POCT Influenza A/B manually resulted   Result Value Ref Range    POC Rapid Influenza A Negative Negative    POC Rapid Influenza B Negative Negative   POCT rapid strep A manually resulted   Result Value Ref Range    POC Rapid Strep Negative Negative      No results found.    Diagnostic study results (if any) were reviewed by CAM De Souza.    Assessment/Plan   Allergies, medications, history, and pertinent labs/EKGs/Imaging reviewed by CAM De Souza.     Medical Decision Making      Orders and Diagnoses  Diagnoses and all orders for this visit:  Infectious mononucleosis without complication, infectious mononucleosis due to unspecified organism  Acute pharyngitis, unspecified etiology  Sore throat  -     POCT Infectious mononucleosis antibody manually resulted  -     POCT Influenza A/B manually resulted  -     POCT rapid strep A manually resulted  Suspected COVID-19 virus infection  -     POCT Covid-19 Rapid Antigen      Medical Admin Record  Start oral prednisone.  Supportive care of viral mononucleosis discussed.  Symptoms should improve in 7 to 10 days.  Increase fluid intake and rest as needed.  Take Tylenol as needed for aches and pain and/or fever.  Return or follow-up with primary care provider if symptoms did not improve and/or pt developed purulent nasal discharge, worsening cough, fever, worsening sore throat, ear pain, sinus pain and headaches.  Call 911 or go to the nearest emergency room if symptoms became severe such as severe pain, shortness of breath, chest tight ness.   Patient verbalized understanding of the instructions and left in stable condition.     Patient disposition: Home    Electronically signed by CAM De Souza  1:06 PM

## 2024-10-24 NOTE — LETTER
October 24, 2024     Patient: Dianna Higgins   YOB: 2000   Date of Visit: 10/24/2024       To Whom It May Concern:    Dianna Higgins was seen in my clinic on 10/24/2024. Please excuse Dianna for her absence from work .  Can return 10/28/14  If you have any questions or concerns, please don't hesitate to call.         Sincerely,         BRIDGER De Souza-CNP

## 2024-10-24 NOTE — PATIENT INSTRUCTIONS
Start oral prednisone.  Supportive care of viral mononucleosis discussed.  Symptoms should improve in 7 to 10 days.  Increase fluid intake and rest as needed.  Take Tylenol as needed for aches and pain and/or fever.  Return or follow-up with primary care provider if symptoms did not improve and/or pt developed purulent nasal discharge, worsening cough, fever, worsening sore throat, ear pain, sinus pain and headaches.  Call 911 or go to the nearest emergency room if symptoms became severe such as severe pain, shortness of breath, chest tight ness.

## 2024-10-24 NOTE — LETTER
October 24, 2024     Patient: Dianna Higgins   YOB: 2000   Date of Visit: 10/24/2024       To Whom It May Concern:    Dianna Higgins was seen in my clinic on 10/24/2024. Please excuse Dianna for her absence from school. Can return 10/28/24    If you have any questions or concerns, please don't hesitate to call.         Sincerely,         BRIDGER De Souza-CNP

## 2025-06-27 ENCOUNTER — APPOINTMENT (OUTPATIENT)
Dept: PRIMARY CARE | Facility: CLINIC | Age: 25
End: 2025-06-27
Payer: COMMERCIAL

## 2025-06-27 VITALS
BODY MASS INDEX: 24.84 KG/M2 | HEIGHT: 59 IN | WEIGHT: 123.2 LBS | DIASTOLIC BLOOD PRESSURE: 83 MMHG | HEART RATE: 85 BPM | SYSTOLIC BLOOD PRESSURE: 119 MMHG

## 2025-06-27 DIAGNOSIS — N63.21 MASS OF UPPER OUTER QUADRANT OF LEFT BREAST: Primary | ICD-10-CM

## 2025-06-27 DIAGNOSIS — Z00.00 WELL ADULT HEALTH CHECK: ICD-10-CM

## 2025-06-27 PROBLEM — L70.0 ACNE VULGARIS: Status: ACTIVE | Noted: 2019-01-21

## 2025-06-27 PROBLEM — F41.9 ACUTE ANXIETY: Status: ACTIVE | Noted: 2025-06-27

## 2025-06-27 PROCEDURE — 3008F BODY MASS INDEX DOCD: CPT | Performed by: FAMILY MEDICINE

## 2025-06-27 PROCEDURE — 1036F TOBACCO NON-USER: CPT | Performed by: FAMILY MEDICINE

## 2025-06-27 PROCEDURE — 99213 OFFICE O/P EST LOW 20 MIN: CPT | Performed by: FAMILY MEDICINE

## 2025-06-27 PROCEDURE — 99395 PREV VISIT EST AGE 18-39: CPT | Performed by: FAMILY MEDICINE

## 2025-06-27 ASSESSMENT — ENCOUNTER SYMPTOMS
RESPIRATORY NEGATIVE: 1
NEUROLOGICAL NEGATIVE: 1
CARDIOVASCULAR NEGATIVE: 1
CONSTITUTIONAL NEGATIVE: 1
LOSS OF SENSATION IN FEET: 0
MUSCULOSKELETAL NEGATIVE: 1
DEPRESSION: 0
GASTROINTESTINAL NEGATIVE: 1
OCCASIONAL FEELINGS OF UNSTEADINESS: 0

## 2025-06-27 ASSESSMENT — PATIENT HEALTH QUESTIONNAIRE - PHQ9
2. FEELING DOWN, DEPRESSED OR HOPELESS: NOT AT ALL
1. LITTLE INTEREST OR PLEASURE IN DOING THINGS: NOT AT ALL
SUM OF ALL RESPONSES TO PHQ9 QUESTIONS 1 AND 2: 0

## 2025-06-27 NOTE — PROGRESS NOTES
"Subjective   Patient ID: Dianna Higgins is a 24 y.o. female who presents for No chief complaint on file..    HPI pt w presentation of left breast density palpable for about 1month on left outer aspcet of left breast, sig in size , not tneder no lovcalized erythema or discharge no nipple tend   Well check     Review of Systems   Constitutional: Negative.    HENT: Negative.     Respiratory: Negative.     Cardiovascular: Negative.    Gastrointestinal: Negative.    Genitourinary:         Left side breast mass on 3 oclock position left breast palp for 1mo    Musculoskeletal: Negative.    Neurological: Negative.        Objective   /83   Pulse 85   Ht (!) 1.499 m (4' 11\")   Wt 55.9 kg (123 lb 3.2 oz)   BMI 24.88 kg/m²     Physical Exam  Vitals reviewed.   Constitutional:       Appearance: Normal appearance. She is normal weight.   Eyes:      Extraocular Movements: Extraocular movements intact.      Conjunctiva/sclera: Conjunctivae normal.      Pupils: Pupils are equal, round, and reactive to light.   Cardiovascular:      Rate and Rhythm: Normal rate and regular rhythm.      Pulses: Normal pulses.      Heart sounds: Normal heart sounds.   Pulmonary:      Effort: Pulmonary effort is normal.      Breath sounds: Normal breath sounds.   Abdominal:      General: Bowel sounds are normal.      Palpations: Abdomen is soft.   Musculoskeletal:         General: Normal range of motion.      Comments: Left breast mass palpable on 3 ocklock position outer left side of left breast mid level in density not tender on exam no discoloration   Skin:     General: Skin is warm and dry.   Neurological:      General: No focal deficit present.      Mental Status: She is alert and oriented to person, place, and time. Mental status is at baseline.         Assessment/Plan   Problem List Items Addressed This Visit    None  Visit Diagnoses         Codes      Mass of upper outer quadrant of left breast    -  Primary N63.21    Relevant Orders "    BI mammo left diagnostic tomosynthesis    BI US breast complete left    CBC and Auto Differential      Well adult health check     Z00.00    Relevant Orders    Comprehensive Metabolic Panel    Lipid Panel

## 2025-06-28 LAB
ALBUMIN SERPL-MCNC: 4.7 G/DL (ref 3.6–5.1)
ALP SERPL-CCNC: 38 U/L (ref 31–125)
ALT SERPL-CCNC: 14 U/L (ref 6–29)
ANION GAP SERPL CALCULATED.4IONS-SCNC: 10 MMOL/L (CALC) (ref 7–17)
AST SERPL-CCNC: 12 U/L (ref 10–30)
BASOPHILS # BLD AUTO: 49 CELLS/UL (ref 0–200)
BASOPHILS NFR BLD AUTO: 0.8 %
BILIRUB SERPL-MCNC: 0.7 MG/DL (ref 0.2–1.2)
BUN SERPL-MCNC: 12 MG/DL (ref 7–25)
CALCIUM SERPL-MCNC: 9.5 MG/DL (ref 8.6–10.2)
CHLORIDE SERPL-SCNC: 102 MMOL/L (ref 98–110)
CHOLEST SERPL-MCNC: 211 MG/DL
CHOLEST/HDLC SERPL: 2.6 (CALC)
CO2 SERPL-SCNC: 24 MMOL/L (ref 20–32)
CREAT SERPL-MCNC: 0.66 MG/DL (ref 0.5–0.96)
EGFRCR SERPLBLD CKD-EPI 2021: 126 ML/MIN/1.73M2
EOSINOPHIL # BLD AUTO: 43 CELLS/UL (ref 15–500)
EOSINOPHIL NFR BLD AUTO: 0.7 %
ERYTHROCYTE [DISTWIDTH] IN BLOOD BY AUTOMATED COUNT: 11.9 % (ref 11–15)
GLUCOSE SERPL-MCNC: 79 MG/DL (ref 65–99)
HCT VFR BLD AUTO: 42.9 % (ref 35–45)
HDLC SERPL-MCNC: 81 MG/DL
HGB BLD-MCNC: 13.9 G/DL (ref 11.7–15.5)
LDLC SERPL CALC-MCNC: 118 MG/DL (CALC)
LYMPHOCYTES # BLD AUTO: 1983 CELLS/UL (ref 850–3900)
LYMPHOCYTES NFR BLD AUTO: 32.5 %
MCH RBC QN AUTO: 29.7 PG (ref 27–33)
MCHC RBC AUTO-ENTMCNC: 32.4 G/DL (ref 32–36)
MCV RBC AUTO: 91.7 FL (ref 80–100)
MONOCYTES # BLD AUTO: 311 CELLS/UL (ref 200–950)
MONOCYTES NFR BLD AUTO: 5.1 %
NEUTROPHILS # BLD AUTO: 3715 CELLS/UL (ref 1500–7800)
NEUTROPHILS NFR BLD AUTO: 60.9 %
NONHDLC SERPL-MCNC: 130 MG/DL (CALC)
PLATELET # BLD AUTO: 366 THOUSAND/UL (ref 140–400)
PMV BLD REES-ECKER: 9 FL (ref 7.5–12.5)
POTASSIUM SERPL-SCNC: 4 MMOL/L (ref 3.5–5.3)
PROT SERPL-MCNC: 7.5 G/DL (ref 6.1–8.1)
RBC # BLD AUTO: 4.68 MILLION/UL (ref 3.8–5.1)
SODIUM SERPL-SCNC: 136 MMOL/L (ref 135–146)
TRIGL SERPL-MCNC: 40 MG/DL
WBC # BLD AUTO: 6.1 THOUSAND/UL (ref 3.8–10.8)

## 2025-06-30 ENCOUNTER — TELEPHONE (OUTPATIENT)
Dept: PRIMARY CARE | Facility: CLINIC | Age: 25
End: 2025-06-30
Payer: COMMERCIAL

## 2025-06-30 NOTE — TELEPHONE ENCOUNTER
----- Message from Jay Jay Juares sent at 6/30/2025 10:50 AM EDT -----    ----- Message -----  From: Juan Carlos Alfaro MD  Sent: 6/28/2025   1:46 PM EDT  To: Jay Jay Juares    All results are stable  no change  ----- Message -----  From: Interface, [employer] Results In  Sent: 6/28/2025   7:49 AM EDT  To: Juan Carlos Alfaro MD

## 2025-07-01 ENCOUNTER — HOSPITAL ENCOUNTER (OUTPATIENT)
Dept: RADIOLOGY | Facility: CLINIC | Age: 25
Discharge: HOME | End: 2025-07-01
Payer: COMMERCIAL

## 2025-07-01 DIAGNOSIS — N63.21 MASS OF UPPER OUTER QUADRANT OF LEFT BREAST: ICD-10-CM

## 2025-07-01 PROCEDURE — 76642 ULTRASOUND BREAST LIMITED: CPT | Mod: LEFT SIDE | Performed by: STUDENT IN AN ORGANIZED HEALTH CARE EDUCATION/TRAINING PROGRAM

## 2025-07-01 PROCEDURE — 76982 USE 1ST TARGET LESION: CPT

## 2025-07-01 PROCEDURE — 76642 ULTRASOUND BREAST LIMITED: CPT | Mod: LT
